# Patient Record
Sex: MALE | Race: WHITE | NOT HISPANIC OR LATINO | Employment: OTHER | URBAN - METROPOLITAN AREA
[De-identification: names, ages, dates, MRNs, and addresses within clinical notes are randomized per-mention and may not be internally consistent; named-entity substitution may affect disease eponyms.]

---

## 2022-12-06 ENCOUNTER — OFFICE VISIT (OUTPATIENT)
Dept: PODIATRY | Facility: CLINIC | Age: 67
End: 2022-12-06
Payer: OTHER GOVERNMENT

## 2022-12-06 VITALS
WEIGHT: 252 LBS | BODY MASS INDEX: 35.28 KG/M2 | HEIGHT: 71 IN | DIASTOLIC BLOOD PRESSURE: 76 MMHG | SYSTOLIC BLOOD PRESSURE: 147 MMHG | HEART RATE: 67 BPM

## 2022-12-06 DIAGNOSIS — L60.9 DISEASE OF NAIL: ICD-10-CM

## 2022-12-06 DIAGNOSIS — E11.49 TYPE II DIABETES MELLITUS WITH NEUROLOGICAL MANIFESTATIONS: Primary | ICD-10-CM

## 2022-12-06 DIAGNOSIS — L84 CORN OR CALLUS: ICD-10-CM

## 2022-12-06 DIAGNOSIS — E11.42 DIABETIC POLYNEUROPATHY ASSOCIATED WITH TYPE 2 DIABETES MELLITUS: ICD-10-CM

## 2022-12-06 PROCEDURE — 99203 PR OFFICE/OUTPT VISIT, NEW, LEVL III, 30-44 MIN: ICD-10-PCS | Mod: 25,S$GLB,, | Performed by: PODIATRIST

## 2022-12-06 PROCEDURE — 11055 ROUTINE FOOT CARE: ICD-10-PCS | Mod: S$GLB,,, | Performed by: PODIATRIST

## 2022-12-06 PROCEDURE — 99203 OFFICE O/P NEW LOW 30 MIN: CPT | Mod: 25,S$GLB,, | Performed by: PODIATRIST

## 2022-12-06 PROCEDURE — 11055 PARING/CUTG B9 HYPRKER LES 1: CPT | Mod: S$GLB,,, | Performed by: PODIATRIST

## 2022-12-06 PROCEDURE — 11721 DEBRIDE NAIL 6 OR MORE: CPT | Mod: 59,S$GLB,, | Performed by: PODIATRIST

## 2022-12-06 PROCEDURE — 11721 ROUTINE FOOT CARE: ICD-10-PCS | Mod: 59,S$GLB,, | Performed by: PODIATRIST

## 2022-12-06 RX ORDER — CARVEDILOL PHOSPHATE 20 MG/1
2 CAPSULE, EXTENDED RELEASE ORAL 2 TIMES DAILY
COMMUNITY
Start: 2022-11-23

## 2022-12-06 RX ORDER — ERGOCALCIFEROL 1.25 MG/1
50000 CAPSULE ORAL
COMMUNITY
Start: 2022-04-08 | End: 2023-04-07

## 2022-12-06 RX ORDER — LISINOPRIL 40 MG/1
1 TABLET ORAL DAILY
COMMUNITY
Start: 2022-09-01 | End: 2023-08-31

## 2022-12-06 RX ORDER — CLOPIDOGREL BISULFATE 75 MG/1
1 TABLET ORAL DAILY
COMMUNITY
Start: 2022-04-08 | End: 2023-04-07

## 2022-12-06 RX ORDER — CLONIDINE HYDROCHLORIDE 0.2 MG/1
1 TABLET ORAL 2 TIMES DAILY
COMMUNITY
Start: 2022-11-23

## 2022-12-06 RX ORDER — INSULIN ASPART 100 [IU]/ML
100 INJECTION, SOLUTION INTRAVENOUS; SUBCUTANEOUS
COMMUNITY
Start: 2022-04-08 | End: 2023-04-08

## 2022-12-06 RX ORDER — LIRAGLUTIDE 6 MG/ML
INJECTION, SOLUTION SUBCUTANEOUS
COMMUNITY
Start: 2022-08-09 | End: 2023-08-09

## 2022-12-06 RX ORDER — DULAGLUTIDE 1.5 MG/.5ML
INJECTION, SOLUTION SUBCUTANEOUS
COMMUNITY
Start: 2022-04-08 | End: 2023-04-07

## 2022-12-06 RX ORDER — FUROSEMIDE 40 MG/1
2 TABLET ORAL 2 TIMES DAILY
COMMUNITY
Start: 2022-04-08 | End: 2023-04-07

## 2022-12-06 RX ORDER — NIFEDIPINE 30 MG/1
1 TABLET, FILM COATED, EXTENDED RELEASE ORAL DAILY
COMMUNITY
Start: 2022-06-09 | End: 2023-06-08

## 2022-12-06 RX ORDER — CARVEDILOL PHOSPHATE 80 MG/1
1 CAPSULE, EXTENDED RELEASE ORAL DAILY
COMMUNITY
Start: 2022-09-01 | End: 2023-08-31

## 2022-12-06 RX ORDER — LATANOPROST 50 UG/ML
SOLUTION/ DROPS OPHTHALMIC
COMMUNITY
Start: 2022-11-23

## 2022-12-06 RX ORDER — ACETAMINOPHEN 500 MG
500 TABLET ORAL
COMMUNITY
Start: 2022-07-11 | End: 2023-07-11

## 2022-12-06 RX ORDER — ATORVASTATIN CALCIUM 80 MG/1
1 TABLET, FILM COATED ORAL DAILY
COMMUNITY
Start: 2022-09-01 | End: 2023-08-31

## 2022-12-06 RX ORDER — INSULIN GLARGINE 100 [IU]/ML
100 INJECTION, SOLUTION SUBCUTANEOUS
COMMUNITY
Start: 2022-04-08 | End: 2023-04-07

## 2022-12-06 RX ORDER — DICLOFENAC SODIUM 10 MG/G
1 GEL TOPICAL
COMMUNITY
Start: 2022-07-11 | End: 2023-07-11

## 2022-12-06 RX ORDER — LEVOTHYROXINE SODIUM 200 UG/1
1 TABLET ORAL DAILY
COMMUNITY
Start: 2022-04-08 | End: 2023-04-07

## 2022-12-06 RX ORDER — EZETIMIBE 10 MG/1
1 TABLET ORAL DAILY
COMMUNITY
Start: 2022-09-01 | End: 2023-08-31

## 2022-12-07 NOTE — PROGRESS NOTES
Subjective:      Patient ID: Ken Wilder is a 66 y.o. male.    Chief Complaint: Foot Ulcer and Diabetes Mellitus    Ken is a 66 y.o. male who presents to the clinic for evaluation and treatment of high risk feet. Ken has a past medical history of Cancer, Cataract, Coronary artery disease, Diabetes mellitus, type 2, Disorder of kidney and ureter, and Hypothyroidism. The patient's chief complaint is long, thick toenails. This patient has documented high risk feet requiring routine maintenance secondary to peripheral neuropathy.  Patient reports burning, tingling, and numbness from the bilateral foot.  Patient states that oral neuropathic pain medications do help improve the symptoms somewhat.    PCP: Mirza Merino MD    Date Last Seen by PCP: 11/2022    Current shoe gear:  Affected Foot: Tennis shoes     Unaffected Foot: Tennis shoes    Hemoglobin A1C   Date Value Ref Range Status   03/20/2021 7.7 (H) <5.7 % Final     Comment:       Prediabetic: 5.7 - 6.4 %  Diabetic: > 6.4 %       Review of Systems   Constitutional: Negative for chills and fever.   Cardiovascular:  Positive for leg swelling. Negative for chest pain.   Respiratory:  Negative for cough and shortness of breath.    Gastrointestinal:  Negative for diarrhea, nausea and vomiting.   Neurological:  Positive for numbness and paresthesias.         Objective:      Physical Exam  Vitals reviewed.   Constitutional:       General: He is not in acute distress.     Appearance: Normal appearance. He is not ill-appearing.   HENT:      Head: Normocephalic.      Nose: Nose normal.   Cardiovascular:      Rate and Rhythm: Normal rate.   Pulmonary:      Effort: Pulmonary effort is normal. No respiratory distress.   Skin:     Capillary Refill: Capillary refill takes 2 to 3 seconds.   Neurological:      Mental Status: He is alert and oriented to person, place, and time.   Psychiatric:         Mood and Affect: Mood normal.         Behavior: Behavior normal.      Neurologic:  Protective and light touch sensation diminished to the distal tip of the bilateral big toe, positive paresthesias reported bilateral foot, positive burning report bilateral foot   Vascular: DP and PT pulses palpable 2/4 bilateral foot, capillary refill time less 3 seconds to the digits, pedal hair growth is decreased to the digits bilateral foot, positive varicosities noted around the ankle, positive +1 nonpitting edema noted to the bilateral foot and ankle  Dermatologic:  No open lesions noted bilateral foot, no rashes noted bilateral foot, severe plantar hyperkeratotic skin lesions/preulcerative callus noted to the left 1st digit IPJ plantarly, nails 1 through 5 bilateral are mildly thickened and discolored with elongation, no interdigital maceration noted bilateral foot   Musculoskeletal:  5/5 muscle strength noted bilateral foot, no pain or tenderness with palpation of severe callus plantar left 1st digit, no masses noted bilateral foot        Assessment:       Encounter Diagnoses   Name Primary?    Type II diabetes mellitus with neurological manifestations Yes    Diabetic polyneuropathy associated with type 2 diabetes mellitus     [place] or callus     Disease of nail          Plan:       Ken was seen today for foot ulcer and diabetes mellitus.    Diagnoses and all orders for this visit:    Type II diabetes mellitus with neurological manifestations  -     Routine Foot Care    Diabetic polyneuropathy associated with type 2 diabetes mellitus  -     Routine Foot Care    [place] or callus  -     Routine Foot Care    Disease of nail  -     Routine Foot Care    I counseled the patient on his conditions, their implications and medical management.        1. Patient was examined and evaluated  2. Patient was advised to continue with daily foot monitoring.  Patient was advised to continue present proper glycemic control, lowering hemoglobin A1c, and adherence to diabetic medication regimen.  Patient will  continue with comfortable shoe gear both inside and outside the home  3. Discussed with patient etiology callus formation.  Patient made aware about possible recurrence over time.  Patient was dispensed offloading aperture pad for reduction of pressure to the area.  Patient was advised to apply moisturizer/ointment to the area for softening purposes.  Patient was advised that he can perform mild debridement of the callus at home safely with emery board nail file.    Routine Foot Care    Date/Time: 12/6/2022 10:15 AM  Performed by: Thaddeus Montanez DPM  Authorized by: Thaddeus Montanez DPM     Consent Done?:  Yes (Verbal)  Hyperkeratotic Skin Lesions?: Yes    Number of trimmed lesions:  1  Location(s):  Left 1st Toe    Nail Care Type:  Debride  Location(s): All  (Left 1st Toe, Left 3rd Toe, Left 2nd Toe, Left 4th Toe, Left 5th Toe, Right 1st Toe, Right 2nd Toe, Right 3rd Toe, Right 4th Toe and Right 5th Toe)  Patient tolerance:  Patient tolerated the procedure well with no immediate complications    4. Discussed with patient fungal nail changes.  Patient will begin OTC conservative treatments such as Vicks vapor rub.    5. Patient will follow-up in 10 weeks or p.r.n. for complaints

## 2023-02-07 ENCOUNTER — OFFICE VISIT (OUTPATIENT)
Dept: PODIATRY | Facility: CLINIC | Age: 68
End: 2023-02-07
Payer: OTHER GOVERNMENT

## 2023-02-07 VITALS
SYSTOLIC BLOOD PRESSURE: 154 MMHG | HEIGHT: 71 IN | HEART RATE: 74 BPM | WEIGHT: 252 LBS | BODY MASS INDEX: 35.28 KG/M2 | DIASTOLIC BLOOD PRESSURE: 68 MMHG

## 2023-02-07 DIAGNOSIS — L60.9 DISEASE OF NAIL: ICD-10-CM

## 2023-02-07 DIAGNOSIS — I73.9 PERIPHERAL VASCULAR DISEASE: Primary | ICD-10-CM

## 2023-02-07 DIAGNOSIS — E11.42 DIABETIC POLYNEUROPATHY ASSOCIATED WITH TYPE 2 DIABETES MELLITUS: ICD-10-CM

## 2023-02-07 DIAGNOSIS — E11.49 TYPE II DIABETES MELLITUS WITH NEUROLOGICAL MANIFESTATIONS: ICD-10-CM

## 2023-02-07 PROCEDURE — 99213 PR OFFICE/OUTPT VISIT, EST, LEVL III, 20-29 MIN: ICD-10-PCS | Mod: 25,S$GLB,, | Performed by: PODIATRIST

## 2023-02-07 PROCEDURE — 11721 DEBRIDE NAIL 6 OR MORE: CPT | Mod: S$GLB,,, | Performed by: PODIATRIST

## 2023-02-07 PROCEDURE — 99213 OFFICE O/P EST LOW 20 MIN: CPT | Mod: 25,S$GLB,, | Performed by: PODIATRIST

## 2023-02-07 PROCEDURE — 11721 ROUTINE FOOT CARE: ICD-10-PCS | Mod: S$GLB,,, | Performed by: PODIATRIST

## 2023-02-07 RX ORDER — FLASH GLUCOSE SENSOR
KIT MISCELLANEOUS
COMMUNITY
Start: 2023-02-01

## 2023-02-07 RX ORDER — CLONIDINE HYDROCHLORIDE 0.1 MG/1
0.2 TABLET ORAL 2 TIMES DAILY
COMMUNITY
Start: 2022-09-29

## 2023-02-07 NOTE — PROGRESS NOTES
Subjective:      Patient ID: Ken Wilder is a 67 y.o. male.    Chief Complaint: Routine Foot Care and Diabetes Mellitus    Ken is a 67 y.o. male who presents to the clinic for evaluation and treatment of high risk feet. Ken has a past medical history of Cancer, Cataract, Coronary artery disease, Diabetes mellitus, type 2, Disorder of kidney and ureter, and Hypothyroidism. The patient's chief complaint is long, thick toenails. This patient has documented high risk feet requiring routine maintenance secondary to peripheral vascular disease.    PCP: Mirza Merino MD    Date Last Seen by PCP: 01/31/2023    Current shoe gear:  Affected Foot: Casual shoes     Unaffected Foot: Casual shoes    Hemoglobin A1C   Date Value Ref Range Status   03/20/2021 7.7 (H) <5.7 % Final     Comment:       Prediabetic: 5.7 - 6.4 %  Diabetic: > 6.4 %       Review of Systems   Constitutional: Negative for chills and fever.   Cardiovascular:  Positive for leg swelling. Negative for chest pain.   Respiratory:  Negative for cough and shortness of breath.    Gastrointestinal:  Negative for diarrhea, nausea and vomiting.   Neurological:  Positive for paresthesias.         Objective:      Physical Exam  Vitals reviewed.   Constitutional:       General: He is not in acute distress.     Appearance: Normal appearance. He is not ill-appearing.   HENT:      Head: Normocephalic.      Nose: Nose normal.   Cardiovascular:      Rate and Rhythm: Normal rate.   Pulmonary:      Effort: Pulmonary effort is normal. No respiratory distress.   Skin:     Capillary Refill: Capillary refill takes 2 to 3 seconds.   Neurological:      Mental Status: He is alert and oriented to person, place, and time.   Psychiatric:         Mood and Affect: Mood normal.         Behavior: Behavior normal.       Neurologic:  Protective and light touch sensation diminished to the distal tip of the bilateral big toe, positive paresthesias reported bilateral foot, positive burning  report bilateral foot   Vascular: DP and PT pulses palpable 2/4 bilateral foot, capillary refill time less 3 seconds to the digits, pedal hair growth is decreased to the digits bilateral foot, positive varicosities noted around the ankle, positive +1 nonpitting edema noted to the bilateral foot and ankle  Dermatologic:  No open lesions noted bilateral foot, no rashes noted bilateral foot, hyperkeratotic skin lesions/preulcerative callus noted to the left 1st digit IPJ plantarly, nails 1 through 5 bilateral are mildly thickened and discolored with elongation, no interdigital maceration noted bilateral foot   Musculoskeletal:  5/5 muscle strength noted bilateral foot, no pain or tenderness with palpation of severe callus plantar left 1st digit, no masses noted bilateral foot        Assessment:       Encounter Diagnoses   Name Primary?    Peripheral vascular disease Yes    Type II diabetes mellitus with neurological manifestations     Diabetic polyneuropathy associated with type 2 diabetes mellitus     Disease of nail          Plan:       Ken was seen today for routine foot care and diabetes mellitus.    Diagnoses and all orders for this visit:    Peripheral vascular disease  -     Routine Foot Care    Type II diabetes mellitus with neurological manifestations  -     Routine Foot Care    Diabetic polyneuropathy associated with type 2 diabetes mellitus  -     Routine Foot Care    Disease of nail  -     Routine Foot Care      I counseled the patient on his conditions, their implications and medical management.           1. Patient was examined and evaluated  2. Patient was advised to continue with daily foot monitoring.  Patient was advised to continue present proper glycemic control, lowering hemoglobin A1c, and adherence to diabetic medication regimen.  Patient will continue with comfortable shoe gear both inside and outside the home    Routine Foot Care    Date/Time: 2/7/2023 10:15 AM  Performed by: Thaddeus OCONNOR  CARINA Montanez  Authorized by: Thaddeus Montanez DPM     Consent Done?:  Yes (Verbal)  Hyperkeratotic Skin Lesions?: No      Nail Care Type:  Debride  Location(s): All  (Left 1st Toe, Left 3rd Toe, Left 2nd Toe, Left 4th Toe, Left 5th Toe, Right 1st Toe, Right 2nd Toe, Right 3rd Toe, Right 4th Toe and Right 5th Toe)  Patient tolerance:  Patient tolerated the procedure well with no immediate complications    3. Discussed with patient etiology callus formation.  Patient made aware about possible recurrence over time.  Patient was advised to apply moisturizer/ointment to the area for softening purposes.  Patient was advised that he can perform mild debridement of the callus at home safely with emery board or  nail file.    4. Discussed with patient fungal nail changes.  Patient will begin OTC conservative treatments such as Vicks vapor rub.    5. Patient will follow-up in 3 months or p.r.n. for complaints

## 2023-04-17 ENCOUNTER — OFFICE VISIT (OUTPATIENT)
Dept: PODIATRY | Facility: CLINIC | Age: 68
End: 2023-04-17
Payer: OTHER GOVERNMENT

## 2023-04-17 VITALS
HEIGHT: 71 IN | DIASTOLIC BLOOD PRESSURE: 78 MMHG | BODY MASS INDEX: 35 KG/M2 | WEIGHT: 250 LBS | SYSTOLIC BLOOD PRESSURE: 145 MMHG | HEART RATE: 65 BPM

## 2023-04-17 DIAGNOSIS — E11.49 TYPE II DIABETES MELLITUS WITH NEUROLOGICAL MANIFESTATIONS: ICD-10-CM

## 2023-04-17 DIAGNOSIS — E11.42 DIABETIC POLYNEUROPATHY ASSOCIATED WITH TYPE 2 DIABETES MELLITUS: ICD-10-CM

## 2023-04-17 DIAGNOSIS — L60.9 DISEASE OF NAIL: Primary | ICD-10-CM

## 2023-04-17 PROCEDURE — 99499 UNLISTED E&M SERVICE: CPT | Mod: S$GLB,,, | Performed by: PODIATRIST

## 2023-04-17 PROCEDURE — 99499 NO LOS: ICD-10-PCS | Mod: S$GLB,,, | Performed by: PODIATRIST

## 2023-04-17 PROCEDURE — 11721 DEBRIDE NAIL 6 OR MORE: CPT | Mod: S$GLB,,, | Performed by: PODIATRIST

## 2023-04-17 PROCEDURE — 11721 ROUTINE FOOT CARE: ICD-10-PCS | Mod: S$GLB,,, | Performed by: PODIATRIST

## 2023-04-17 RX ORDER — LIDOCAINE 50 MG/G
PATCH TOPICAL
COMMUNITY
Start: 2023-03-25

## 2023-04-17 RX ORDER — CLOPIDOGREL BISULFATE 75 MG/1
75 TABLET ORAL
COMMUNITY
Start: 2023-03-09

## 2023-04-17 RX ORDER — GLUCAGON 3 MG/1
POWDER NASAL
COMMUNITY
Start: 2023-04-06

## 2023-04-17 RX ORDER — INSULIN GLARGINE 100 [IU]/ML
INJECTION, SOLUTION SUBCUTANEOUS
COMMUNITY
Start: 2023-03-09

## 2023-04-17 RX ORDER — SEMAGLUTIDE 1.7 MG/.75ML
INJECTION, SOLUTION SUBCUTANEOUS
COMMUNITY
Start: 2023-03-15

## 2023-04-17 RX ORDER — FERROUS SULFATE 325(65) MG
325 TABLET ORAL
COMMUNITY
Start: 2023-02-28

## 2023-04-17 RX ORDER — LEVOTHYROXINE SODIUM 200 UG/1
200 TABLET ORAL
COMMUNITY
Start: 2023-03-09

## 2023-04-17 RX ORDER — NIFEDIPINE 60 MG
60 TABLET, EXTENDED RELEASE 24 HR ORAL
COMMUNITY
Start: 2023-04-06

## 2023-04-17 RX ORDER — METHOCARBAMOL 750 MG/1
TABLET, FILM COATED ORAL
COMMUNITY
Start: 2023-03-25

## 2023-04-17 RX ORDER — SEMAGLUTIDE 2.4 MG/.75ML
INJECTION, SOLUTION SUBCUTANEOUS
COMMUNITY
Start: 2023-04-10

## 2023-04-17 NOTE — PROGRESS NOTES
Subjective:     Patient ID: Ken Wilder is a 67 y.o. male.    Chief Complaint: Routine Foot Care    Ken is a 67 y.o. male who presents to the clinic for evaluation and treatment of high risk feet. Ken has a past medical history of Cancer, Cataract, Coronary artery disease, Diabetes mellitus, type 2, Disorder of kidney and ureter, and Hypothyroidism. The patient's chief complaint is long, thick toenails. This patient has documented high risk feet requiring routine maintenance secondary to peripheral neuropathy.    PCP: Mirza Merino MD    Date Last Seen by PCP: 04/06/2023    Current shoe gear:  Affected Foot: Tennis shoes     Unaffected Foot: Tennis shoes    Hemoglobin A1C   Date Value Ref Range Status   03/20/2021 7.7 (H) <5.7 % Final     Comment:       Prediabetic: 5.7 - 6.4 %  Diabetic: > 6.4 %       Review of Systems   Constitutional: Negative for chills and fever.   Cardiovascular:  Negative for chest pain and leg swelling.   Respiratory:  Negative for cough and shortness of breath.    Gastrointestinal:  Negative for diarrhea, nausea and vomiting.   Neurological:  Positive for numbness and paresthesias.      Objective:     Physical Exam  Vitals reviewed.   Constitutional:       General: He is not in acute distress.     Appearance: Normal appearance. He is not ill-appearing.   HENT:      Head: Normocephalic.      Nose: Nose normal.   Cardiovascular:      Rate and Rhythm: Normal rate.   Pulmonary:      Effort: Pulmonary effort is normal. No respiratory distress.   Skin:     Capillary Refill: Capillary refill takes 2 to 3 seconds.   Neurological:      Mental Status: He is alert and oriented to person, place, and time.   Psychiatric:         Mood and Affect: Mood normal.         Behavior: Behavior normal.         Thought Content: Thought content normal.        Neurologic:  Protective and light touch sensation diminished to the distal tip of the bilateral big toe, positive paresthesias reported bilateral foot,  positive burning report bilateral foot   Vascular: DP and PT pulses palpable 2/4 bilateral foot, capillary refill time less 3 seconds to the digits, pedal hair growth is decreased to the digits bilateral foot, positive varicosities noted around the ankle, positive +1 nonpitting edema noted to the bilateral foot and ankle  Dermatologic:  No open lesions noted bilateral foot, no rashes noted bilateral foot, hyperkeratotic skin lesions/preulcerative callus noted to the left 1st digit IPJ plantarly, nails 1 through 5 bilateral are mildly thickened and discolored with elongation, no interdigital maceration noted bilateral foot   Musculoskeletal:  5/5 muscle strength noted bilateral foot, no pain or tenderness with palpation of severe callus plantar left 1st digit, no masses noted bilateral foot      Assessment:      Encounter Diagnoses   Name Primary?    Disease of nail Yes    Type II diabetes mellitus with neurological manifestations     Diabetic polyneuropathy associated with type 2 diabetes mellitus      Plan:     Ken was seen today for routine foot care.    Diagnoses and all orders for this visit:    Disease of nail  -     DIABETIC SHOES FOR HOME USE  -     Routine Foot Care    Type II diabetes mellitus with neurological manifestations  -     DIABETIC SHOES FOR HOME USE  -     Routine Foot Care    Diabetic polyneuropathy associated with type 2 diabetes mellitus  -     DIABETIC SHOES FOR HOME USE  -     Routine Foot Care      I counseled the patient on his conditions, their implications and medical management.        1. Patient was examined and evaluated  2. Patient was advised to continue with daily foot monitoring.  Patient was advised to continue present proper glycemic control, lowering hemoglobin A1c, and adherence to diabetic medication regimen.  Patient will continue with comfortable shoe gear both inside and outside the home  Routine Foot Care    Date/Time: 4/17/2023 10:30 AM  Performed by: Thaddeus OCONNOR  CARINA Montanez  Authorized by: Thaddeus Montanez DPM     Consent Done?:  Yes (Verbal)  Hyperkeratotic Skin Lesions?: No      Nail Care Type:  Debride  Location(s): All  (Left 1st Toe, Left 3rd Toe, Left 2nd Toe, Left 4th Toe, Left 5th Toe, Right 1st Toe, Right 2nd Toe, Right 3rd Toe, Right 4th Toe and Right 5th Toe)  Patient tolerance:  Patient tolerated the procedure well with no immediate complications    3. Discussed with patient etiology callus formation.  Patient made aware about possible recurrence over time.  Patient was advised to apply moisturizer/ointment to the area for softening purposes.  Patient was advised that he can perform mild debridement of the callus at home safely with emery board or  nail file.    4. Discussed with patient fungal nail changes.  Patient will begin OTC conservative treatments such as Vicks vapor rub.    5. Patient was dispensed prescription [facility]  Patient will also attempt to get diabetic socks as well.  Patient will get diabetes shoe secondary to history of peripheral neuropathy with preulcerative callus formation.  6. Patient will follow-up in 3 months or p.r.n. for complaints

## 2023-08-01 ENCOUNTER — OFFICE VISIT (OUTPATIENT)
Dept: PODIATRY | Facility: CLINIC | Age: 68
End: 2023-08-01
Payer: MEDICARE

## 2023-08-01 VITALS
WEIGHT: 250 LBS | HEIGHT: 71 IN | BODY MASS INDEX: 35 KG/M2 | HEART RATE: 71 BPM | DIASTOLIC BLOOD PRESSURE: 76 MMHG | SYSTOLIC BLOOD PRESSURE: 153 MMHG

## 2023-08-01 DIAGNOSIS — E11.49 TYPE II DIABETES MELLITUS WITH NEUROLOGICAL MANIFESTATIONS: ICD-10-CM

## 2023-08-01 DIAGNOSIS — L60.9 DISEASE OF NAIL: ICD-10-CM

## 2023-08-01 DIAGNOSIS — E11.42 DIABETIC POLYNEUROPATHY ASSOCIATED WITH TYPE 2 DIABETES MELLITUS: Primary | ICD-10-CM

## 2023-08-01 DIAGNOSIS — I73.9 PERIPHERAL VASCULAR DISEASE: ICD-10-CM

## 2023-08-01 PROCEDURE — 11721 ROUTINE FOOT CARE: ICD-10-PCS | Mod: Q9,S$GLB,, | Performed by: PODIATRIST

## 2023-08-01 PROCEDURE — 11721 DEBRIDE NAIL 6 OR MORE: CPT | Mod: Q9,S$GLB,, | Performed by: PODIATRIST

## 2023-08-01 PROCEDURE — 99499 UNLISTED E&M SERVICE: CPT | Mod: S$GLB,,, | Performed by: PODIATRIST

## 2023-08-01 PROCEDURE — 99499 NO LOS: ICD-10-PCS | Mod: S$GLB,,, | Performed by: PODIATRIST

## 2023-08-01 RX ORDER — NIFEDIPINE 60 MG/1
60 TABLET, EXTENDED RELEASE ORAL
COMMUNITY
Start: 2023-06-05

## 2023-08-01 RX ORDER — NIFEDIPINE 90 MG/1
90 TABLET, FILM COATED, EXTENDED RELEASE ORAL
COMMUNITY
Start: 2023-04-24

## 2023-08-01 RX ORDER — ERGOCALCIFEROL 1.25 MG/1
CAPSULE ORAL
COMMUNITY
Start: 2023-05-13

## 2023-08-01 NOTE — PROGRESS NOTES
Subjective:     Patient ID: Ken Wilder is a 67 y.o. male.    Chief Complaint: Nail Care    Ken is a 67 y.o. male who presents to the clinic for evaluation and treatment of high risk feet. Ken has a past medical history of Cancer, Cataract, Coronary artery disease, Diabetes mellitus, type 2, Disorder of kidney and ureter, and Hypothyroidism. The patient's chief complaint is long, thick toenails. This patient has documented high risk feet requiring routine maintenance secondary to peripheral vascular disease.    PCP: Mirza Merino MD    Date Last Seen by PCP: 04/18/2023    Current shoe gear:  Affected Foot: Tennis shoes     Unaffected Foot: Tennis shoes    Hemoglobin A1C   Date Value Ref Range Status   03/20/2021 7.7 (H) <5.7 % Final     Comment:       Prediabetic: 5.7 - 6.4 %  Diabetic: > 6.4 %       Review of Systems   Constitutional: Negative for chills and fever.   Cardiovascular:  Positive for leg swelling. Negative for chest pain.   Respiratory:  Negative for cough and shortness of breath.    Gastrointestinal:  Negative for diarrhea, nausea and vomiting.   Neurological:  Positive for paresthesias.        Objective:     Physical Exam  Vitals reviewed.   Constitutional:       General: He is not in acute distress.     Appearance: Normal appearance. He is not ill-appearing.   HENT:      Head: Normocephalic.      Nose: Nose normal.   Cardiovascular:      Rate and Rhythm: Normal rate.   Pulmonary:      Effort: Pulmonary effort is normal. No respiratory distress.   Skin:     Capillary Refill: Capillary refill takes 2 to 3 seconds.   Neurological:      Mental Status: He is alert and oriented to person, place, and time.   Psychiatric:         Mood and Affect: Mood normal.         Behavior: Behavior normal.         Thought Content: Thought content normal.     Neurologic:  Protective and light touch sensation diminished to the distal tip of the bilateral big toe, positive paresthesias reported bilateral foot,  positive burning report bilateral foot   Vascular: DP and PT pulses palpable 2/4 bilateral foot, capillary refill time less 3 seconds to the digits, pedal hair growth is decreased to the digits bilateral foot, positive varicosities noted around the ankle, positive +1 nonpitting edema noted to the bilateral foot and ankle  Dermatologic:  No open lesions noted bilateral foot, no rashes noted bilateral foot, hyperkeratotic skin lesions/preulcerative callus noted to the left 1st digit IPJ plantarly, nails 1 through 5 bilateral are mildly thickened and discolored with elongation, no interdigital maceration noted bilateral foot   Musculoskeletal:  5/5 muscle strength noted bilateral foot, no masses noted bilateral foot      Assessment:      Encounter Diagnoses   Name Primary?    Diabetic polyneuropathy associated with type 2 diabetes mellitus Yes    Type II diabetes mellitus with neurological manifestations     Disease of nail     Peripheral vascular disease      Plan:     Ken was seen today for nail care.    Diagnoses and all orders for this visit:    Diabetic polyneuropathy associated with type 2 diabetes mellitus    Type II diabetes mellitus with neurological manifestations    Disease of nail    Peripheral vascular disease      I counseled the patient on his conditions, their implications and medical management.        1. Patient was examined and evaluated  2. Patient was advised to continue with daily foot monitoring.  Patient was advised to continue present proper glycemic control, lowering hemoglobin A1c, and adherence to diabetic medication regimen.  Patient will continue with comfortable shoe gear both inside and outside the home  Routine Foot Care    Date/Time: 8/1/2023 2:30 PM    Performed by: Thaddeus Montanez DPM  Authorized by: Thaddeus Montanez DPM    Consent Done?:  Yes (Verbal)    Nail Care Type:  Debride  Location(s): All  (Left 1st Toe, Left 3rd Toe, Left 2nd Toe, Left 4th Toe, Left 5th Toe, Right  1st Toe, Right 2nd Toe, Right 3rd Toe, Right 4th Toe and Right 5th Toe)  Patient tolerance:  Patient tolerated the procedure well with no immediate complications      3. Discussed with patient etiology callus formation.  Patient made aware about possible recurrence over time.  Patient was advised to apply moisturizer/ointment to the area for softening purposes.  Patient was advised that he can perform mild debridement of the callus at home safely with emery board or  nail file.    4. Discussed with patient fungal nail changes.  Patient will begin OTC conservative treatments such as Vicks vapor rub.    5. Patient will follow-up in 3 months or p.r.n. for complaints

## 2024-01-11 ENCOUNTER — OFFICE VISIT (OUTPATIENT)
Dept: PODIATRY | Facility: CLINIC | Age: 69
End: 2024-01-11
Payer: MEDICARE

## 2024-01-11 VITALS — WEIGHT: 250 LBS | BODY MASS INDEX: 35 KG/M2 | HEIGHT: 71 IN

## 2024-01-11 DIAGNOSIS — E11.9 COMPREHENSIVE DIABETIC FOOT EXAMINATION, TYPE 2 DM, ENCOUNTER FOR: Primary | ICD-10-CM

## 2024-01-11 DIAGNOSIS — L84 CORN OR CALLUS: ICD-10-CM

## 2024-01-11 DIAGNOSIS — E11.42 DIABETIC POLYNEUROPATHY ASSOCIATED WITH TYPE 2 DIABETES MELLITUS: ICD-10-CM

## 2024-01-11 DIAGNOSIS — E11.49 TYPE II DIABETES MELLITUS WITH NEUROLOGICAL MANIFESTATIONS: ICD-10-CM

## 2024-01-11 DIAGNOSIS — I73.9 PERIPHERAL VASCULAR DISEASE: ICD-10-CM

## 2024-01-11 DIAGNOSIS — L60.9 DISEASE OF NAIL: ICD-10-CM

## 2024-01-11 PROCEDURE — 99213 OFFICE O/P EST LOW 20 MIN: CPT | Mod: 25,S$GLB,, | Performed by: PODIATRIST

## 2024-01-11 PROCEDURE — 11721 DEBRIDE NAIL 6 OR MORE: CPT | Mod: 59,Q9,S$GLB, | Performed by: PODIATRIST

## 2024-01-11 PROCEDURE — 11055 PARING/CUTG B9 HYPRKER LES 1: CPT | Mod: Q9,S$GLB,, | Performed by: PODIATRIST

## 2024-01-31 NOTE — PROGRESS NOTES
Subjective:     Patient ID: Ken Study II is a 68 y.o. male.    Chief Complaint: Nail Care    Ken is a 68 y.o. male who presents to the clinic for evaluation and treatment of high risk feet. Ken has a past medical history of Cancer, Cataract, Coronary artery disease, Diabetes mellitus, type 2, Disorder of kidney and ureter, and Hypothyroidism. The patient's chief complaint is long, thick toenails. This patient has documented high risk feet requiring routine maintenance secondary to peripheral vascular disease.    PCP: Mirza Merino MD    Date Last Seen by PCP: 01/03/2024    Current shoe gear:  Affected Foot: Tennis shoes     Unaffected Foot: Tennis shoes    Hemoglobin A1C   Date Value Ref Range Status   03/20/2021 7.7 (H) <5.7 % Final     Comment:       Prediabetic: 5.7 - 6.4 %  Diabetic: > 6.4 %       Review of Systems   Constitutional: Negative for chills and fever.   Cardiovascular:  Positive for leg swelling. Negative for chest pain.   Respiratory:  Negative for cough and shortness of breath.    Gastrointestinal:  Negative for diarrhea, nausea and vomiting.   Neurological:  Positive for paresthesias.        Objective:     Physical Exam  Vitals reviewed.   Constitutional:       General: He is not in acute distress.     Appearance: Normal appearance. He is not ill-appearing.   HENT:      Head: Normocephalic.      Nose: Nose normal.   Pulmonary:      Effort: Pulmonary effort is normal. No respiratory distress.   Skin:     Capillary Refill: Capillary refill takes 2 to 3 seconds.   Neurological:      Mental Status: He is alert and oriented to person, place, and time.   Psychiatric:         Mood and Affect: Mood normal.         Behavior: Behavior normal.         Thought Content: Thought content normal.         Neurologic:  Protective and light touch sensation diminished to the distal tip of the bilateral big toe, positive paresthesias reported bilateral foot, positive burning report bilateral foot   Vascular:  DP and PT pulses palpable 2/4 bilateral foot, capillary refill time less 3 seconds to the digits, pedal hair growth is decreased to the digits bilateral foot, positive varicosities noted around the ankle, positive +1 nonpitting edema noted to the bilateral foot and ankle  Dermatologic:  No open lesions noted bilateral foot, no rashes noted bilateral foot, hyperkeratotic skin lesion/ pre-ulcerative callus noted to the left 1st digit IPJ plantarly, nails 1 through 5 bilateral are mildly thickened and discolored with elongation, no interdigital maceration noted bilateral foot   Musculoskeletal:  5/5 muscle strength noted bilateral foot, no masses noted bilateral foot       Assessment:      Encounter Diagnoses   Name Primary?    Comprehensive diabetic foot examination, type 2 DM, encounter for Yes    Diabetic polyneuropathy associated with type 2 diabetes mellitus     Type II diabetes mellitus with neurological manifestations     Disease of nail     Corn or callus     Peripheral vascular disease      Plan:     Ken was seen today for nail care.    Diagnoses and all orders for this visit:    Comprehensive diabetic foot examination, type 2 DM, encounter for  -     Routine Foot Care    Diabetic polyneuropathy associated with type 2 diabetes mellitus  -     Routine Foot Care    Type II diabetes mellitus with neurological manifestations  -     Routine Foot Care    Disease of nail  -     Routine Foot Care    Sanderson or callus  -     Routine Foot Care    Peripheral vascular disease  -     Routine Foot Care      I counseled the patient on his conditions, their implications and medical management.        1. Patient was examined and evaluated  2. Patient was advised to continue with daily foot monitoring.  Patient was advised to continue present proper glycemic control, lowering hemoglobin A1c, and adherence to diabetic medication regimen.  Patient will continue with comfortable shoe gear both inside and outside the home   Routine  Foot Care    Date/Time: 1/11/2024 8:30 AM    Performed by: Thaddeus Montanez DPM  Authorized by: Thaddeus Montanez DPM    Consent Done?:  Yes (Verbal)  Hyperkeratotic Skin Lesions?: Yes    Number of trimmed lesions:  1  Location(s):  Left 1st Toe    Nail Care Type:  Debride  Location(s): All  (Left 1st Toe, Left 3rd Toe, Left 2nd Toe, Left 4th Toe, Left 5th Toe, Right 1st Toe, Right 2nd Toe, Right 3rd Toe, Right 4th Toe and Right 5th Toe)  Patient tolerance:  Patient tolerated the procedure well with no immediate complications      3. Discussed with patient etiology callus formation.  Patient made aware about possible recurrence over time.  Patient was advised to apply moisturizer/ointment to the area for softening purposes.  Patient was advised that he can perform mild debridement of the callus at home safely with emery board or  nail file.    4. Discussed with patient fungal nail changes.  Patient will begin OTC conservative treatments such as Vicks vapor rub.    5. Patient will follow-up in 3 months or p.r.n. for complaints

## 2024-07-16 ENCOUNTER — OFFICE VISIT (OUTPATIENT)
Dept: PODIATRY | Facility: CLINIC | Age: 69
End: 2024-07-16
Payer: MEDICARE

## 2024-07-16 VITALS — HEIGHT: 71 IN | BODY MASS INDEX: 35.39 KG/M2 | WEIGHT: 252.81 LBS

## 2024-07-16 DIAGNOSIS — I73.9 PERIPHERAL VASCULAR DISEASE: ICD-10-CM

## 2024-07-16 DIAGNOSIS — E11.9 COMPREHENSIVE DIABETIC FOOT EXAMINATION, TYPE 2 DM, ENCOUNTER FOR: Primary | ICD-10-CM

## 2024-07-16 DIAGNOSIS — E11.42 DIABETIC POLYNEUROPATHY ASSOCIATED WITH TYPE 2 DIABETES MELLITUS: ICD-10-CM

## 2024-07-16 DIAGNOSIS — E11.49 TYPE II DIABETES MELLITUS WITH NEUROLOGICAL MANIFESTATIONS: ICD-10-CM

## 2024-07-16 DIAGNOSIS — L60.9 DISEASE OF NAIL: ICD-10-CM

## 2024-07-16 PROCEDURE — 11721 DEBRIDE NAIL 6 OR MORE: CPT | Mod: Q9,S$GLB,, | Performed by: PODIATRIST

## 2024-07-16 PROCEDURE — 99499 UNLISTED E&M SERVICE: CPT | Mod: S$GLB,,, | Performed by: PODIATRIST

## 2024-07-16 RX ORDER — ALLOPURINOL 100 MG/1
TABLET ORAL
COMMUNITY
Start: 2024-02-26 | End: 2025-02-21

## 2024-07-16 RX ORDER — OXYCODONE AND ACETAMINOPHEN 7.5; 325 MG/1; MG/1
TABLET ORAL
COMMUNITY
Start: 2024-03-27

## 2024-07-16 RX ORDER — AMOXICILLIN 500 MG/1
500 CAPSULE ORAL 3 TIMES DAILY
COMMUNITY
Start: 2024-03-27

## 2024-07-16 RX ORDER — COLCHICINE 0.6 MG/1
TABLET ORAL
COMMUNITY
Start: 2024-02-26 | End: 2025-02-25

## 2024-07-16 NOTE — PROGRESS NOTES
Subjective:     Patient ID: Ken Study II is a 68 y.o. male.    Chief Complaint: Nail Care    Ken is a 68 y.o. male who presents to the clinic for evaluation and treatment of high risk feet. Ken has a past medical history of Cancer, Cataract, Coronary artery disease, Diabetes mellitus, type 2, Disorder of kidney and ureter, and Hypothyroidism. The patient's chief complaint is long, thick toenails. This patient has documented high risk feet requiring routine maintenance secondary to peripheral vascular disease.    PCP: Mirza Merino MD    Date Last Seen by PCP: 05/31/2024    Current shoe gear:  Affected Foot: Tennis shoes     Unaffected Foot: Tennis shoes    Hemoglobin A1C   Date Value Ref Range Status   03/20/2021 7.7 (H) <5.7 % Final     Comment:       Prediabetic: 5.7 - 6.4 %  Diabetic: > 6.4 %       Review of Systems   Constitutional: Negative for chills and fever.   Cardiovascular:  Negative for chest pain and leg swelling.   Respiratory:  Negative for cough and shortness of breath.    Gastrointestinal:  Negative for diarrhea, nausea and vomiting.        Objective:     Physical Exam  Vitals reviewed.   Constitutional:       General: He is not in acute distress.     Appearance: Normal appearance. He is not ill-appearing.   HENT:      Head: Normocephalic.      Nose: Nose normal.   Pulmonary:      Effort: Pulmonary effort is normal. No respiratory distress.   Skin:     Capillary Refill: Capillary refill takes 2 to 3 seconds.   Neurological:      Mental Status: He is alert and oriented to person, place, and time.   Psychiatric:         Mood and Affect: Mood normal.         Behavior: Behavior normal.         Thought Content: Thought content normal.       Neurologic:  Protective and light touch sensation diminished to the distal tip of the bilateral big toe, positive paresthesias reported bilateral foot, positive burning report bilateral foot   Vascular: DP and PT pulses palpable 2/4 bilateral foot, capillary  refill time less 3 seconds to the digits, pedal hair growth is decreased to the digits bilateral foot, positive varicosities noted around the ankle, positive +1 nonpitting edema noted to the bilateral foot and ankle  Dermatologic:  No open lesions noted bilateral foot, no rashes noted bilateral foot, hyperkeratotic skin lesion/ pre-ulcerative callus noted to the left 1st digit IPJ plantarly, nails 1 through 5 bilateral are mildly thickened and discolored with elongation, no interdigital maceration noted bilateral foot   Musculoskeletal:  5/5 muscle strength noted bilateral foot, no masses noted bilateral foot     Assessment:      Encounter Diagnoses   Name Primary?    Comprehensive diabetic foot examination, type 2 DM, encounter for Yes    Type II diabetes mellitus with neurological manifestations     Diabetic polyneuropathy associated with type 2 diabetes mellitus     Peripheral vascular disease     Disease of nail      Plan:     Ken was seen today for nail care.    Diagnoses and all orders for this visit:    Comprehensive diabetic foot examination, type 2 DM, encounter for  -     Routine Foot Care    Type II diabetes mellitus with neurological manifestations  -     Routine Foot Care    Diabetic polyneuropathy associated with type 2 diabetes mellitus  -     Routine Foot Care    Peripheral vascular disease  -     Routine Foot Care    Disease of nail  -     Routine Foot Care      I counseled the patient on his conditions, their implications and medical management.        1. Patient was examined and evaluated  2. Patient was advised to continue with daily foot monitoring.  Patient was advised to continue present proper glycemic control, lowering hemoglobin A1c, and adherence to diabetic medication regimen.  Patient will continue with comfortable shoe gear both inside and outside the home   Routine Foot Care    Date/Time: 7/16/2024 2:30 PM    Performed by: Thaddeus Montanez DPM  Authorized by: Thaddeus Montanez,  DPM    Consent Done?:  Yes (Verbal)  Hyperkeratotic Skin Lesions?: No      Nail Care Type:  Debride  Location(s): All  (Left 1st Toe, Left 3rd Toe, Left 2nd Toe, Left 4th Toe, Left 5th Toe, Right 1st Toe, Right 2nd Toe, Right 3rd Toe, Right 4th Toe and Right 5th Toe)  Patient tolerance:  Patient tolerated the procedure well with no immediate complications      3. Discussed with patient etiology callus formation.  Patient made aware about possible recurrence over time.  Patient was advised to apply moisturizer/ointment to the area for softening purposes.  Patient was advised that he can perform mild debridement of the callus at home safely with emery board or  nail file.    4. Discussed with patient fungal nail changes.  Patient will begin OTC conservative treatments such as Vicks vapor rub.    5. Patient will follow-up in 3 months or p.r.n. for complaints

## 2025-01-02 ENCOUNTER — TELEPHONE (OUTPATIENT)
Dept: PODIATRY | Facility: CLINIC | Age: 70
End: 2025-01-02
Payer: MEDICARE

## 2025-01-02 NOTE — TELEPHONE ENCOUNTER
----- Message from Marycarmen sent at 1/2/2025  2:01 PM CST -----  Regarding: Sooner Appointment Request  Contact: patient at [phone]  Type:  Sooner Appointment Request    Name of Caller:  patient at [phone]  When is the first available appointment?  January 21  Symptoms:  toenail has fallen off    Additional Information:  Please call and advise thank you.

## 2025-01-09 ENCOUNTER — OFFICE VISIT (OUTPATIENT)
Dept: PODIATRY | Facility: CLINIC | Age: 70
End: 2025-01-09
Payer: MEDICARE

## 2025-01-09 VITALS — BODY MASS INDEX: 36.4 KG/M2 | WEIGHT: 260 LBS | HEIGHT: 71 IN

## 2025-01-09 DIAGNOSIS — E11.9 COMPREHENSIVE DIABETIC FOOT EXAMINATION, TYPE 2 DM, ENCOUNTER FOR: Primary | ICD-10-CM

## 2025-01-09 DIAGNOSIS — L60.9 DISEASE OF NAIL: ICD-10-CM

## 2025-01-09 DIAGNOSIS — E11.42 DIABETIC POLYNEUROPATHY ASSOCIATED WITH TYPE 2 DIABETES MELLITUS: ICD-10-CM

## 2025-01-09 DIAGNOSIS — I73.9 PERIPHERAL VASCULAR DISEASE: ICD-10-CM

## 2025-01-09 DIAGNOSIS — E11.49 TYPE II DIABETES MELLITUS WITH NEUROLOGICAL MANIFESTATIONS: ICD-10-CM

## 2025-01-09 PROCEDURE — 11721 DEBRIDE NAIL 6 OR MORE: CPT | Mod: Q9,S$GLB,, | Performed by: PODIATRIST

## 2025-01-09 PROCEDURE — 99213 OFFICE O/P EST LOW 20 MIN: CPT | Mod: 25,S$GLB,, | Performed by: PODIATRIST

## 2025-01-09 RX ORDER — ACETAMINOPHEN 325 MG/1
325 TABLET, FILM COATED ORAL
COMMUNITY
Start: 2025-01-06 | End: 2025-01-31

## 2025-01-09 RX ORDER — LEVOTHYROXINE SODIUM 175 UG/1
175 TABLET ORAL
COMMUNITY
Start: 2024-11-14

## 2025-01-09 RX ORDER — POLYETHYLENE GLYCOL 3350, SODIUM SULFATE, SODIUM CHLORIDE, POTASSIUM CHLORIDE, ASCORBIC ACID, SODIUM ASCORBATE 140-9-5.2G
KIT ORAL
COMMUNITY
Start: 2024-12-27

## 2025-01-09 RX ORDER — EMPAGLIFLOZIN 10 MG/1
10 TABLET, FILM COATED ORAL
COMMUNITY
Start: 2024-12-10

## 2025-01-09 RX ORDER — METHOCARBAMOL 500 MG/1
TABLET, FILM COATED ORAL
COMMUNITY
Start: 2024-12-30

## 2025-01-09 NOTE — PROGRESS NOTES
Subjective:     Patient ID: Ken Study II is a 69 y.o. male    Chief Complaint: Nail Problem       Ken is a 69 y.o. male who presents to the clinic for evaluation and treatment of high risk feet. Ken has a past medical history of Cancer, Cataract, Coronary artery disease, Diabetes mellitus, type 2, Disorder of kidney and ureter, and Hypothyroidism. The patient's chief complaint is long, thick toenails. This patient has documented high risk feet requiring routine maintenance secondary to peripheral vascular disease.    PCP: Red Caicedo  Date Last Seen by PCP: 01/08/2025    Current shoe gear:  Affected Foot: Casual shoes     Unaffected Foot: Casual shoes    Hemoglobin A1C   Date Value Ref Range Status   03/20/2021 7.7 (H) <5.7 % Final     Comment:       Prediabetic: 5.7 - 6.4 %  Diabetic: > 6.4 %       Review of Systems   Constitutional: Negative.  Negative for chills and fever.   Respiratory:  Negative for cough and shortness of breath.    Cardiovascular:  Positive for leg swelling. Negative for chest pain.   Gastrointestinal:  Negative for diarrhea, nausea and vomiting.   Neurological:  Positive for tingling.        Objective:   Physical Exam  Vitals reviewed.   Constitutional:       General: He is not in acute distress.     Appearance: Normal appearance. He is not ill-appearing.   HENT:      Head: Normocephalic.      Nose: Nose normal.   Cardiovascular:      Pulses:           Dorsalis pedis pulses are 1+ on the right side and 1+ on the left side.        Posterior tibial pulses are 1+ on the right side and 1+ on the left side.   Pulmonary:      Effort: Pulmonary effort is normal. No respiratory distress.   Skin:     Capillary Refill: Capillary refill takes 2 to 3 seconds.   Neurological:      Mental Status: He is alert and oriented to person, place, and time.   Psychiatric:         Mood and Affect: Mood normal.         Behavior: Behavior normal.         Thought Content: Thought content normal.        Foot  Exam    General  General Appearance: appears stated age and healthy   Orientation: alert and oriented to person, place, and time   Affect: appropriate   Gait: unimpaired       Right Foot/Ankle     Inspection and Palpation  Swelling: dorsum   Skin Exam: skin changes and abnormal color;     Neurovascular  Dorsalis pedis: 1+  Posterior tibial: 1+    Muscle Strength  Ankle dorsiflexion: 4+  Ankle plantar flexion: 4+  Ankle inversion: 4+  Ankle eversion: 4+  Great toe extension: 4+  Great toe flexion: 4+      Left Foot/Ankle      Inspection and Palpation  Swelling: dorsum   Skin Exam: skin changes and abnormal color;     Neurovascular  Dorsalis pedis: 1+  Posterior tibial: 1+    Muscle Strength  Ankle dorsiflexion: 4+  Ankle plantar flexion: 4+  Ankle inversion: 4+  Ankle eversion: 4+  Great toe extension: 4+  Great toe flexion: 4+         Neurologic:  Protective and light touch sensation diminished to the distal tip of the bilateral big toe, positive paresthesias reported bilateral foot, positive burning report bilateral foot   Vascular: pedal hair growth is decreased to the digits bilateral foot, positive varicosities noted around the ankle, positive +1 nonpitting edema noted to the bilateral foot and ankle  Dermatologic:  nails 1 through 5 bilateral are mildly thickened and discolored with elongation, except absent right 3rd digit nail plate no interdigital maceration noted bilateral foot   Assessment:         1. Comprehensive diabetic foot examination, type 2 DM, encounter for    2. Type II diabetes mellitus with neurological manifestations    3. Diabetic polyneuropathy associated with type 2 diabetes mellitus    4. Disease of nail    5. Peripheral vascular disease       Plan:     Ken Study II was seen today for   Chief Complaint   Patient presents with    Nail Problem       Assessment & Plan           Routine Foot Care    Date/Time: 1/9/2025 9:00 AM    Performed by: Thaddeus Montanez DPM  Authorized by: Tarik  Thaddeus OCONNOR DPM    Consent Done?:  Yes (Verbal)  Hyperkeratotic Skin Lesions?: No      Nail Care Type:  Debride(Left 1st Toe, Left 5th Toe, Left 2nd Toe, Left 3rd Toe, Left 4th Toe, Right 2nd Toe, Right 1st Toe, Right 5th Toe and Right 4th Toe)  Patient tolerance:  Patient tolerated the procedure well with no immediate complications       1. Patient was examined and evaluated.    2. Discussed with patient the etiology of nail fungus and as possible secondary issue to prior nail trauma.  Discussed with patient OTC topical conservative treatments such as Vicks vapor rub, tea tree oil as well as coconut oil.  Discussed with patient risks and benefits oral Lamisil therapy.   3. Patient was advised to continue with daily foot monitoring.  Patient will continue efforts proper glycemic control, lowering hemoglobin A1c, adherence to diabetic medication regimen  4. Discussed with patient etiology of peripheral vascular disease.  Patient was advised elevate lower extremity rest consider daily use of compression stockings.  Patient will monitor dietary salt intake and water consumption.    5. Patient made aware that right 3rd digit nail plate has fallen off potentially secondary to trauma but area does not look infected and seems to be healing as expected.  Patient is made aware that a new nail plate should grow back over time.  6. Patient will follow-up in 3-6 months or p.r.n. for complaints      Thaddeus Montanez DPM  [address] [address] [address] [phone]      This note was created using [employer] direct voice recognition software. Note may have occasional typographical errors that may not have been identified and edited despite initial review prior to signing.

## 2025-04-09 ENCOUNTER — OFFICE VISIT (OUTPATIENT)
Dept: PODIATRY | Facility: CLINIC | Age: 70
End: 2025-04-09
Payer: MEDICARE

## 2025-04-09 VITALS — HEIGHT: 71 IN | WEIGHT: 257 LBS | BODY MASS INDEX: 35.98 KG/M2

## 2025-04-09 DIAGNOSIS — L60.9 DISEASE OF NAIL: ICD-10-CM

## 2025-04-09 DIAGNOSIS — E11.42 DIABETIC POLYNEUROPATHY ASSOCIATED WITH TYPE 2 DIABETES MELLITUS: ICD-10-CM

## 2025-04-09 DIAGNOSIS — E11.49 TYPE II DIABETES MELLITUS WITH NEUROLOGICAL MANIFESTATIONS: Primary | ICD-10-CM

## 2025-04-09 DIAGNOSIS — I73.9 PERIPHERAL VASCULAR DISEASE: ICD-10-CM

## 2025-04-09 RX ORDER — APIXABAN 5 MG/1
5 TABLET, FILM COATED ORAL 2 TIMES DAILY
COMMUNITY
Start: 2025-02-12

## 2025-04-09 RX ORDER — METOPROLOL SUCCINATE 100 MG/1
100 TABLET, EXTENDED RELEASE ORAL
COMMUNITY
Start: 2025-02-07

## 2025-04-16 NOTE — PROGRESS NOTES
Subjective:     Patient ID: Ken Study II is a 69 y.o. male    Chief Complaint: Nail Care       Ken is a 69 y.o. male who presents to the clinic for evaluation and treatment of high risk feet. Ken has a past medical history of Cancer, Cataract, Coronary artery disease, Diabetes mellitus, type 2, Disorder of kidney and ureter, and Hypothyroidism. The patient's chief complaint is long, thick toenails. This patient has documented high risk feet requiring routine maintenance secondary to peripheral vascular disease.    PCP: Keely Cain MD    Date Last Seen by PCP: 03/12/2025    Current shoe gear:  Affected Foot: Tennis shoes     Unaffected Foot: Tennis shoes    Hemoglobin A1C   Date Value Ref Range Status   03/20/2021 7.7 (H) <5.7 % Final     Comment:       Prediabetic: 5.7 - 6.4 %  Diabetic: > 6.4 %       Review of Systems   Constitutional: Negative.  Negative for chills and fever.   Respiratory:  Negative for cough and shortness of breath.    Cardiovascular:  Positive for leg swelling. Negative for chest pain.   Gastrointestinal:  Negative for diarrhea, nausea and vomiting.   Neurological:  Positive for tingling.        Objective:   Physical Exam  Vitals reviewed.   Constitutional:       General: He is not in acute distress.     Appearance: Normal appearance. He is not ill-appearing.   HENT:      Head: Normocephalic.      Nose: Nose normal.   Cardiovascular:      Pulses:           Dorsalis pedis pulses are 2+ on the right side and 2+ on the left side.        Posterior tibial pulses are 1+ on the right side and 1+ on the left side.   Pulmonary:      Effort: Pulmonary effort is normal. No respiratory distress.   Skin:     Capillary Refill: Capillary refill takes 2 to 3 seconds.   Neurological:      Mental Status: He is alert and oriented to person, place, and time.   Psychiatric:         Mood and Affect: Mood normal.         Behavior: Behavior normal.         Thought Content: Thought content normal.         Foot Exam    General  General Appearance: appears stated age and healthy   Orientation: alert and oriented to person, place, and time   Affect: appropriate   Gait: unimpaired       Right Foot/Ankle     Inspection and Palpation  Hallux limitus: yes  Skin Exam: skin changes and abnormal color;     Neurovascular  Dorsalis pedis: 2+  Posterior tibial: 1+    Muscle Strength  Ankle dorsiflexion: 5  Ankle plantar flexion: 5  Ankle inversion: 5  Ankle eversion: 5  Great toe extension: 5  Great toe flexion: 5      Left Foot/Ankle      Inspection and Palpation  Hallux limitus: yes  Skin Exam: skin changes and abnormal color;     Neurovascular  Dorsalis pedis: 2+  Posterior tibial: 1+    Muscle Strength  Ankle dorsiflexion: 5  Ankle plantar flexion: 5  Ankle inversion: 5  Ankle eversion: 5  Great toe extension: 5  Great toe flexion: 5           Assessment:         1. Type II diabetes mellitus with neurological manifestations    2. Diabetic polyneuropathy associated with type 2 diabetes mellitus    3. Disease of nail    4. Peripheral vascular disease       Plan:     Ken Study II was seen today for   Chief Complaint   Patient presents with    Nail Care       Assessment & Plan           Routine Foot Care    Date/Time: 4/9/2025 9:30 AM    Performed by: Thaddeus Montanez DPM  Authorized by: Thaddeus Montanez DPM    Consent Done?:  Yes (Verbal)    Nail Care Type:  Debride  Location(s): All  (Left 1st Toe, Left 3rd Toe, Left 2nd Toe, Left 4th Toe, Left 5th Toe, Right 1st Toe, Right 2nd Toe, Right 3rd Toe, Right 4th Toe and Right 5th Toe)  Patient tolerance:  Patient tolerated the procedure well with no immediate complications       1. Patient was examined and evaluated.    2. Patient was advised to continue with daily foot monitoring.  Patient will continue efforts proper glycemic control, lowering hemoglobin A1c, adherence to diabetic medication regimen  3. Discussed with patient etiology of peripheral vascular disease.   Patient was advised elevate lower extremity rest consider daily use of compression stockings.  Patient will monitor dietary salt intake and water consumption.    4. Discussed with patient the etiology of nail fungus and as possible secondary issue to prior nail trauma.  Discussed with patient OTC topical conservative treatments such as Vicks vapor rub, tea tree oil as well as coconut oil.  Discussed with patient risks and benefits oral Lamisil therapy.   5. Patient will follow-up in 3-4 months or p.r.n. for complaints      Carin Montanez DPM  [address] [address] [address]  [phone]      This note was created using [employer] direct voice recognition software. Note may have occasional typographical errors that may not have been identified and edited despite initial review prior to signing.